# Patient Record
Sex: FEMALE | Race: WHITE
[De-identification: names, ages, dates, MRNs, and addresses within clinical notes are randomized per-mention and may not be internally consistent; named-entity substitution may affect disease eponyms.]

---

## 2022-10-27 ENCOUNTER — HOSPITAL ENCOUNTER (EMERGENCY)
Dept: HOSPITAL 97 - ER | Age: 32
Discharge: HOME | End: 2022-10-27
Payer: COMMERCIAL

## 2022-10-27 VITALS — OXYGEN SATURATION: 100 % | SYSTOLIC BLOOD PRESSURE: 112 MMHG | DIASTOLIC BLOOD PRESSURE: 78 MMHG

## 2022-10-27 VITALS — TEMPERATURE: 97.9 F

## 2022-10-27 DIAGNOSIS — J06.9: Primary | ICD-10-CM

## 2022-10-27 DIAGNOSIS — Z20.822: ICD-10-CM

## 2022-10-27 PROCEDURE — 71045 X-RAY EXAM CHEST 1 VIEW: CPT

## 2022-10-27 PROCEDURE — 99283 EMERGENCY DEPT VISIT LOW MDM: CPT

## 2022-10-27 PROCEDURE — 87804 INFLUENZA ASSAY W/OPTIC: CPT

## 2022-10-27 NOTE — ER
Nurse's Notes                                                                                     

 [facility]                                                                 

Name: Rosalia Salguero                                                                            

Age: 31 yrs                                                                                       

Sex: Female                                                                                       

: 1990                                                                                   

MRN: [id number]                                                                                   

Arrival Date: 10/27/2022                                                                          

Time: 17:14                                                                                       

Account#: [id number]                                                                            

Bed 17                                                                                            

Private MD:                                                                                       

Diagnosis: Acute upper respiratory infection, unspecified                                         

                                                                                                  

Presentation:                                                                                     

10/27                                                                                             

17:20 Chief complaint: Patient states: Chest tightness, cough, headache, fever, congestion X  ld1 

      5. Coronavirus screen: Client presents with at least one sign or symptom that may           

      indicate coronavirus-19. Standard/surgical mask placed on the client. Ebola Screen: No      

      symptoms or risks identified at this time. Initial Sepsis Screen: Does the patient meet     

      any 2 criteria? No. Patient's initial sepsis screen is negative. Does the patient have      

      a suspected source of infection? No. Patient's initial sepsis screen is negative. Risk      

      Assessment: Do you want to hurt yourself or someone else? Patient reports no desire to      

      harm self or others. Onset of symptoms was 2022.                                

17:20 Method Of Arrival: Ambulatory                                                           ld1 

17:20 Acuity: ADELE 4                                                                           ld1 

                                                                                                  

Triage Assessment:                                                                                

17:21 General: Appears in no apparent distress. comfortable, Behavior is calm, cooperative,   ld1 

      appropriate for age. Pain: Denies pain. EENT: No signs and/or symptoms were reported        

      regarding the EENT system. Neuro: Level of Consciousness is awake, alert, obeys             

      commands, Oriented to person, place, time, situation, Appropriate for age.                  

      Cardiovascular: Capillary refill < 3 seconds Patient's skin is warm and dry.                

      Respiratory: Airway is patent Respiratory effort is even, unlabored. GI: Abdomen is         

      flat, non-distended. : No signs and/or symptoms were reported regarding the               

      genitourinary system. Derm: No signs and/or symptoms reported regarding the                 

      dermatologic system. Musculoskeletal: No signs and/or symptoms reported regarding the       

      musculoskeletal system.                                                                     

                                                                                                  

Historical:                                                                                       

- Allergies:                                                                                      

17:21 No Known Allergies;                                                                     ld1 

- Home Meds:                                                                                      

17:21 duloxetine 30 mg oral cpDR 1 cap once daily [Active];                                   ld1 

- PMHx:                                                                                           

17:21 Fibromyalgia;                                                                           ld1 

- PSHx:                                                                                           

17:21 Tubal ligation;                                                                         ld1 

                                                                                                  

- Immunization history:: Adult Immunizations up to date, Client reports having NOT                

  received the Covid vaccine.                                                                     

- Social history:: Smoking status: Reported history of juuling and/or vaping.                     

  Patient/guardian denies using alcohol.                                                          

                                                                                                  

                                                                                                  

Screenin:30 Abuse screen: Denies threats or abuse. Denies injuries from another. Nutritional        mb8 

      screening: No deficits noted. Tuberculosis screening: No symptoms or risk factors           

      identified. Fall Risk None identified.                                                      

                                                                                                  

Assessment:                                                                                       

17:28 General: Appears uncomfortable, Behavior is calm, cooperative, appropriate for age.     mb8 

      Pain: Complains of pain in head Pain does not radiate. Pain currently is 8 out of 10 on     

      a pain scale. Quality of pain is described as aching, Current management is with            

      Tylenol, last Tylenol at 1400 today. Cardiovascular: Denies chest pain. Respiratory:        

      Reports shortness of breath cough that is productive, Airway is patent Respiratory          

      effort is even, unlabored, Respiratory pattern is regular, symmetrical, Breath sounds       

      are clear bilaterally. the patient has mild shortness of breath Denies air hunger. GI:      

      Patient currently denies diarrhea, nausea, vomiting. : Denies burning with urination,     

      urinary frequency.                                                                          

18:04 Reassessment: Patient and/or family updated on plan of care and expected duration. Pain mb8 

      level reassessed. Patient is alert, oriented x 3, equal unlabored respirations, skin        

      warm/dry/pink.                                                                              

18:42 Reassessment: Patient and/or family updated on plan of care and expected duration. Pain mb8 

      level reassessed. Patient is alert, oriented x 3, equal unlabored respirations, skin        

      warm/dry/pink. Patient states feeling better.                                               

                                                                                                  

Vital Signs:                                                                                      

17:20  / 76; Pulse 104; Resp 18; Temp 97.9(O); Pulse Ox 100% on R/A; Weight 54.43 kg;   ld1 

      Height 5 ft. 10 in. (177.80 cm); Pain 0/10;                                                 

18:08  / 79; Pulse 94; Resp 20; Pulse Ox 99% ; Pain 8/10;                               mb8 

18:41  / 78; Pulse 82; Resp 16; Pulse Ox 100% on R/A; Pain 5/10;                        mb8 

17:20 Body Mass Index 17.22 (54.43 kg, 177.80 cm)                                             ld1 

                                                                                                  

ED Course:                                                                                        

17:14 Patient arrived in ED.                                                                  mr  

17:16 Dorothy Fountain FNP-C is PHCP.                                                        kb  

17:16 Conrado Kaba MD is Attending Physician.                                             kb  

17:21 Triage completed.                                                                       ld1 

17:21 Arm band placed on right wrist.                                                         ld1 

17:23 Alonzo Amaya, RN is Primary Nurse.                                                    mb8 

17:30 Patient has correct armband on for positive identification. Bed in low position. Call   mb8 

      light in reach. Side rails up X2. Client placed on continuous cardiac and pulse             

      oximetry monitoring. NIBP monitoring applied.                                               

17:32 No provider procedures requiring assistance completed. Patient did not have IV access   mb8 

      during this emergency room visit.                                                           

18:24 Chest Single View XRAY In Process Unspecified.                                          EDMS

                                                                                                  

Administered Medications:                                                                         

18:14 Drug: Ibuprofen 800 mg Route: PO;                                                       mb8 

18:42 Follow up: Response: No adverse reaction; Pain is decreased                             mb8 

                                                                                                  

                                                                                                  

Medication:                                                                                       

17:30 VIS not applicable for this client.                                                     mb8 

                                                                                                  

Outcome:                                                                                          

18:54 Discharge ordered by MD.                                                                kb  

19:02 Discharged to home ambulatory.                                                          mb8 

19:02 Condition: stable                                                                           

19:02 Discharge instructions given to patient, Instructed on discharge instructions, follow       

      up and referral plans. Demonstrated understanding of instructions, follow-up care.          

19:02 Patient left the ED.                                                                    mb8 

                                                                                                  

Signatures:                                                                                       

Dispatcher [employer]                           EDMS                                                 

Dorothy Fountain, FNP-C                 FNP-Monica JonesaCarmen                                 mr                                                   

Triny Ruiz RN                     RN   ld1                                                  

Alonzo Amaya, RN                      RN   mb8                                                  

                                                                                                  

Corrections: (The following items were deleted from the chart)                                    

17:22 17:21 Home Meds: None; ld1                                                              ld1 

17:22 17:21 PMHx: None; ld1                                                                   ld1 

                                                                                                  

**************************************************************************************************

## 2022-10-27 NOTE — EDPHYS
Physician Documentation                                                                           

 [facility]                                                                 

Name: Rosalia Salguero                                                                            

Age: 31 yrs                                                                                       

Sex: Female                                                                                       

: 1990                                                                                   

MRN: [id number]                                                                                   

Arrival Date: 10/27/2022                                                                          

Time: 17:14                                                                                       

Account#: [id number]                                                                            

Bed 17                                                                                            

Private MD:                                                                                       

ED Physician Conrado Kaba                                                                      

HPI:                                                                                              

10/27                                                                                             

20:05 This 31 yrs old Female presents to ER via Ambulatory with complaints of Flu Symptoms.   kb  

20:05 The patient or guardian reports cough, that is intermittent, described as mild, flu     kb  

      symptoms, low-grade fever, myalgias. Onset: The symptoms/episode began/occurred 7           

      day(s) ago. Severity of symptoms: At their worst the symptoms were moderate, in the         

      emergency department the symptoms are unchanged. Modifying factors: The symptoms are        

      alleviated by nothing, the symptoms are aggravated by nothing. Associated signs and         

      symptoms: Pertinent positives: fever, rhinorrhea. The patient has not experienced           

      similar symptoms in the past. The patient has not recently seen a physician.                

                                                                                                  

Historical:                                                                                       

- Allergies:                                                                                      

17:21 No Known Allergies;                                                                     ld1 

- Home Meds:                                                                                      

17:21 duloxetine 30 mg oral cpDR 1 cap once daily [Active];                                   ld1 

- PMHx:                                                                                           

17:21 Fibromyalgia;                                                                           ld1 

- PSHx:                                                                                           

17:21 Tubal ligation;                                                                         ld1 

                                                                                                  

- Immunization history:: Adult Immunizations up to date, Client reports having NOT                

  received the Covid vaccine.                                                                     

- Social history:: Smoking status: Reported history of juuling and/or vaping.                     

  Patient/guardian denies using alcohol.                                                          

                                                                                                  

                                                                                                  

ROS:                                                                                              

20:05 Abdomen/GI: Negative for abdominal pain, nausea, vomiting, diarrhea, and constipation.  kb  

20:05 Constitutional: Positive for body aches, chills, fatigue, malaise.                          

20:05 ENT: Positive for rhinorrhea, sinus congestion.                                             

20:05 Respiratory: Positive for cough, Negative for dyspnea on exertion, hemoptysis,              

      orthopnea, pleurisy, shortness of breath, sputum production, wheezing.                      

20:05 Neuro: Positive for headache.                                                               

20:05 All other systems are negative.                                                             

                                                                                                  

Exam:                                                                                             

20:04 Constitutional:  This is a well developed, well nourished patient who is awake, alert,  kb  

      and in no acute distress. Head/Face:  Normocephalic, atraumatic. ENT:  Moist Mucous         

      membranes Cardiovascular:  Regular rate and rhythm with a normal S1 and S2.  No             

      gallops, murmurs, or rubs.  No pulse deficits. Respiratory:  Respirations even and          

      unlabored. No increased work of breathing. Talking in full sentences Abdomen/GI:  Soft,     

      non-tender. No distention Skin:  Warm, dry with normal turgor.  Normal color. MS/           

      Extremity:  Pulses equal, no cyanosis.  Neurovascular intact.  Full, normal range of        

      motion. Neuro:  Awake and alert, GCS 15, oriented to person, place, time, and               

      situation. Moves all extremities. Normal gait. Psych:  Awake, alert, with orientation       

      to person, place and time.  Behavior, mood, and affect are within normal limits.            

                                                                                                  

Vital Signs:                                                                                      

17:20  / 76; Pulse 104; Resp 18; Temp 97.9(O); Pulse Ox 100% on R/A; Weight 54.43 kg;   ld1 

      Height 5 ft. 10 in. (177.80 cm); Pain 0/10;                                                 

18:08  / 79; Pulse 94; Resp 20; Pulse Ox 99% ; Pain 8/10;                               mb8 

18:41  / 78; Pulse 82; Resp 16; Pulse Ox 100% on R/A; Pain 5/10;                        mb8 

17:20 Body Mass Index 17.22 (54.43 kg, 177.80 cm)                                             ld1 

                                                                                                  

MDM:                                                                                              

17:20 Patient medically screened.                                                             kb  

20:04 Data reviewed: vital signs, nurses notes. Data interpreted: Pulse oximetry: on room air kb  

      is 100 %. Interpretation: normal. Counseling: I had a detailed discussion with the          

      patient and/or guardian regarding: the historical points, exam findings, and any            

      diagnostic results supporting the discharge/admit diagnosis, lab results, radiology         

      results, the need for outpatient follow up, a family practitioner, to return to the         

      emergency department if symptoms worsen or persist or if there are any questions or         

      concerns that arise at home.                                                                

                                                                                                  

10/27                                                                                             

17:21 Order name: Flu; Complete Time: 18:11                                                   kb  

10/27                                                                                             

17:21 Order name: COVID-19 SARS RT PCR (Document "Date of Onset" if Symptomatic); Complete    kb  

      Time: 18:25                                                                                 

10/27                                                                                             

17:21 Order name: Chest Single View XRAY; Complete Time: 18:31                                kb  

                                                                                                  

Administered Medications:                                                                         

18:14 Drug: Ibuprofen 800 mg Route: PO;                                                       mb8 

18:42 Follow up: Response: No adverse reaction; Pain is decreased                             mb8 

                                                                                                  

                                                                                                  

Disposition Summary:                                                                              

10/27/22 18:54                                                                                    

Discharge Ordered                                                                                 

      Location: Home                                                                          kb  

      Condition: Stable                                                                       kb  

      Diagnosis                                                                                   

        - Acute upper respiratory infection, unspecified                                      kb  

      Followup:                                                                               kb  

        - With: Emergency Department                                                               

        - When: As needed                                                                          

        - Reason: Worsening of condition                                                           

      Followup:                                                                               kb  

        - With: Private Physician                                                                  

        - When: 2 - 3 days                                                                         

        - Reason: Recheck today's complaints, Continuance of care, Re-evaluation by your           

      physician                                                                                   

      Discharge Instructions:                                                                     

        - Discharge Summary Sheet                                                             kb  

        - Upper Respiratory Infection, Adult, Easy-to-Read                                    kb  

        - Viral Respiratory Infection, Easy-To-Read                                           kb  

      Forms:                                                                                      

        - Medication Reconciliation Form                                                      kb  

        - Thank You Letter                                                                    kb  

        - Antibiotic Education                                                                kb  

        - Prescription Opioid Use                                                             kb  

Signatures:                                                                                       

Dispatcher [employer]                           EDDorothy Nixon, PORTILLO-MAGDALENA NATH-Triny Boss, RN                     RN   ld1                                                  

Alonzo Amaya RN                      RN   mb8                                                  

                                                                                                  

Corrections: (The following items were deleted from the chart)                                    

17: 17:21 Home Meds: None; ld1                                                              ld1 

17: 17:21 PMHx: None; ld1                                                                   ld1 

                                                                                                  

**************************************************************************************************

## 2022-10-27 NOTE — RAD REPORT
EXAM DESCRIPTION:  RAD - Chest Single View - 10/27/2022 6:23 pm

 

CLINICAL HISTORY:  Cough

Chest pain.

 

COMPARISON:  No comparisons

 

FINDINGS:  Portable technique limits examination quality.

 

The lungs are grossly clear. The heart is normal in size. No displaced fractures.

 

IMPRESSION:  No acute intrathoracic process suspected.